# Patient Record
Sex: MALE | Race: WHITE | NOT HISPANIC OR LATINO | Employment: FULL TIME | URBAN - METROPOLITAN AREA
[De-identification: names, ages, dates, MRNs, and addresses within clinical notes are randomized per-mention and may not be internally consistent; named-entity substitution may affect disease eponyms.]

---

## 2017-08-29 ENCOUNTER — TELEPHONE (OUTPATIENT)
Dept: MEDICAL GROUP | Age: 27
End: 2017-08-29

## 2017-08-29 NOTE — TELEPHONE ENCOUNTER
NEW PATIENT VISIT PRE-VISIT PLANNING    1.  EpicCare Patient is checked in Patient Demographics? YES    2.  Immunizations were updated in Epic using WebIZ?: No WebIZ record       •  Web Iz Recommendations:     3.  Is this appointment scheduled as a Hospital Follow-Up? No    4.  Patient is due for the following Health Maintenance Topics:   Health Maintenance Due   Topic Date Due   • IMM DTaP/Tdap/Td Vaccine (1 - Tdap) 08/22/2005   • IMM INFLUENZA (1) 09/01/2017           5.  Reviewed/Updated the following with patient:       •   Preferred Pharmacy? YES       •   Preferred Lab? YES       •   Medications? YES. Was Abstract Encounter opened and chart updated? YES       •   Social History? YES. Was Abstract Encounter opened and chart updated? YES       •   Family History? YES. Was Abstract Encounter opened and chart updated? YES    6.  Updated Care Team?       •   DME Company (gait device, O2, CPAP, etc.) N\A       •   Other Specialists (eye doctor, derm, GYN, cardiology, endo, etc): N\A    7.  Patient was informed to arrive 15 min prior to their scheduled appointment and bring in their medication bottles.

## 2017-08-30 ENCOUNTER — OFFICE VISIT (OUTPATIENT)
Dept: MEDICAL GROUP | Age: 27
End: 2017-08-30
Payer: COMMERCIAL

## 2017-08-30 VITALS
SYSTOLIC BLOOD PRESSURE: 124 MMHG | WEIGHT: 238.6 LBS | OXYGEN SATURATION: 95 % | DIASTOLIC BLOOD PRESSURE: 70 MMHG | TEMPERATURE: 97.7 F | HEIGHT: 73 IN | BODY MASS INDEX: 31.62 KG/M2 | HEART RATE: 85 BPM

## 2017-08-30 DIAGNOSIS — E66.9 OBESITY (BMI 30.0-34.9): ICD-10-CM

## 2017-08-30 DIAGNOSIS — F43.10 PTSD (POST-TRAUMATIC STRESS DISORDER): ICD-10-CM

## 2017-08-30 DIAGNOSIS — F32.1 MODERATE SINGLE CURRENT EPISODE OF MAJOR DEPRESSIVE DISORDER (HCC): ICD-10-CM

## 2017-08-30 DIAGNOSIS — Z23 NEED FOR TDAP VACCINATION: ICD-10-CM

## 2017-08-30 PROCEDURE — 90471 IMMUNIZATION ADMIN: CPT | Performed by: INTERNAL MEDICINE

## 2017-08-30 PROCEDURE — 90715 TDAP VACCINE 7 YRS/> IM: CPT | Performed by: INTERNAL MEDICINE

## 2017-08-30 PROCEDURE — 99204 OFFICE O/P NEW MOD 45 MIN: CPT | Mod: 25 | Performed by: INTERNAL MEDICINE

## 2017-08-30 RX ORDER — BUPROPION HYDROCHLORIDE 100 MG/1
100 TABLET, EXTENDED RELEASE ORAL 2 TIMES DAILY
Qty: 60 TAB | Refills: 3 | Status: SHIPPED | OUTPATIENT
Start: 2017-08-30 | End: 2022-06-14

## 2017-08-30 ASSESSMENT — PATIENT HEALTH QUESTIONNAIRE - PHQ9
5. POOR APPETITE OR OVEREATING: 0 - NOT AT ALL
CLINICAL INTERPRETATION OF PHQ2 SCORE: 4
SUM OF ALL RESPONSES TO PHQ QUESTIONS 1-9: 15

## 2017-08-30 ASSESSMENT — PAIN SCALES - GENERAL: PAINLEVEL: NO PAIN

## 2017-08-31 NOTE — ASSESSMENT & PLAN NOTE
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 However he overcomes fear and he is able to work very well. He still feel anxious and nervous sometimes. He denied hallucination or delusion. He denied thoughts of harming other people or himself.

## 2017-08-31 NOTE — PROGRESS NOTES
Janes Troncoso is a 27 y.o. male here to establish care and the evaluation and management of:      HPI:    Moderate single current episode of major depressive disorder (CMS-HCC)  Patient presented to clinic with his father for his depression and PTSD. 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 Since then, he believes that he might have emotional trauma and PTSD. He already changed his job and he is working as a  now. He reported that he has depressed mood constantly with loss of interest and unable to focus on his job. He feels sad and grief without reason. He denied change in appetite. He state that he tried to control his mood with smoking marijuana or drinking alcohol. He stated that he used to smoke marijuana several times a day and he cut down to once a week. He drinks 4-6 can of beer every other day. He denied other substance use or denied IV drug use. He has never been treated with antidepressants in the past. He had thought of suicide in the past, but he denied suicidal ideation or plan or homicidal ideation or plan currently.    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 However he overcomes fear and he is able to work very well. He still feel anxious and nervous sometimes. He denied hallucination or delusion. He denied thoughts of harming other people or himself.    Current medicines (including changes today)  Current Outpatient Prescriptions   Medication Sig Dispense Refill   • buPROPion SR (WELLBUTRIN-SR) 100 MG TABLET SR 12 HR Take 1 Tab by mouth 2 times a day. 60 Tab 3     No current facility-administered medications for this visit.      He  has a past medical history of Anxiety and Depression.  He  has a past surgical history that includes tonsillectomy.  Social History   Substance Use Topics   • Smoking status:  "Former Smoker     Packs/day: 1.00     Years: 2.00     Types: Cigarettes     Quit date: 2015   • Smokeless tobacco: Former User     Types: Chew   • Alcohol use 19.2 oz/week     32 Cans of beer per week      Comment: 6 can beer every other day     Social History     Social History Narrative   • No narrative on file     Family History   Problem Relation Age of Onset   • Cancer Mother 58     breast cancer   • Arthritis Mother    • Cancer Father      laryngeal cancer   • Lung Disease Father      emphysema   • No Known Problems Brother    • Cancer Maternal Grandmother      breast cancer   • Cancer Maternal Grandfather      liver cancer   • No Known Problems Paternal Grandmother    • No Known Problems Paternal Grandfather      Family Status   Relation Status   • Mother Alive   • Father Alive   • Brother Alive   • Maternal Grandmother    • Maternal Grandfather    • Paternal Grandmother    • Paternal Grandfather      Health Maintenance Topics with due status: Overdue       Topic Date Due    IMM DTaP/Tdap/Td Vaccine 2007     Health Maintenance Topics with due status: Due Soon       Topic Date Due    IMM INFLUENZA 2017         ROS    Gen.: Denied weight change, appetite change, fatigue.  ENT: Denied sinus tenderness, nasal congestion, runny nose, or sore throat  CVS: Denied chest pain, palpitations, legs swelling.  Respiratory: Denied cough, shortness of breath, wheezing.  GI: Denied abdominal pain, constipation or diarrhea.  Endocrine: Denied temperature intolerance, increased frequency of urination, polyphagia or polydipsia.  Musculoskeletal: Denied back pain or joint pain.    All other systems reviewed and are negative     Objective:     Blood pressure 124/70, pulse 85, temperature 36.5 °C (97.7 °F), height 1.854 m (6' 1\"), weight 108.2 kg (238 lb 9.6 oz), SpO2 95 %. Body mass index is 31.48 kg/m².  Physical Exam:    Constitutional: Well nourished and Well developed, Alert, no " distress.  Skin: Warm, dry, good turgor, no rashes in visible areas.  Eye: Equal, round and reactive, conjunctiva clear, lids normal.  ENMT: Lips without lesions, good dentition, oropharynx clear.  Neck: Trachea midline, no masses, no thyromegaly. No cervical or supraclavicular lymphadenopathy.  Respiratory: Unlabored respiratory effort, lungs clear to auscultation, no wheezes, no ronchi.  Cardiovascular: Normal S1, S2, no murmur, no edema.   Abdomen: Soft, non distended, non-tender, no masses, no hepatosplenomegaly. Bowel sound normal.  Extremities: No edema, no clubbing, no cyanosis.  Psych: Alert and oriented x3, normal affect and mood.          Assessment and Plan:   The following treatment plan was discussed       1. Moderate single current episode of major depressive disorder (CMS-HCC)  - Prescribed Wellbutrin  mg twice a day. Advise patient and father to closely monitor his mood and signs or symptoms of side effects from taking Wellbutrin. He lives with his friends. He also keeps in touch with his father who lives in town.  - Discussed with patient regarding the use and side effects of medication as well as black box warning of suicidality risk with medication. Patient verbally understands. Recommend to call 911 or go to ER if patient has suicidal ideation or plan.   - Refer to psychiatrist and psychologist.  - buPROPion SR (WELLBUTRIN-SR) 100 MG TABLET SR 12 HR; Take 1 Tab by mouth 2 times a day.  Dispense: 60 Tab; Refill: 3  - CBC WITH DIFFERENTIAL; Future  - COMP METABOLIC PANEL; Future  - Patient has been identified as being depressed and appropriate orders and counseling have been given  - REFERRAL TO PSYCHIATRY  - REFERRAL TO PSYCHOLOGY    2. PTSD (post-traumatic stress disorder)  - Advised to do regular physical exercise to release stress and anxiety.  - Refer to psychiatrist for further management. Refer to psychologist for cognitive behavioral treatment.  - buPROPion SR (WELLBUTRIN-SR) 100 MG  TABLET SR 12 HR; Take 1 Tab by mouth 2 times a day.  Dispense: 60 Tab; Refill: 3  - CBC WITH DIFFERENTIAL; Future  - COMP METABOLIC PANEL; Future  - Patient has been identified as being depressed and appropriate orders and counseling have been given  - REFERRAL TO PSYCHIATRY  - REFERRAL TO PSYCHOLOGY    3. Obesity (BMI 30.0-34.9)  - Counseled for healthy diet and regular physical exercise to lose weight.   - CBC WITH DIFFERENTIAL; Future  - COMP METABOLIC PANEL; Future  - Patient identified as having weight management issue.  Appropriate orders and counseling given.    4. Need for Tdap vaccination  - Tdap vaccine was given today after reviewing risks and benefits as well as side effects of vaccine.  - TDAP VACCINE =>8YO IM        Records requested.  Followup: Return in about 8 weeks (around 10/25/2017), or if symptoms worsen or fail to improve, for depression, PTSD, lab review. sooner should new symptoms or problems arise.      Please note that this dictation was created using voice recognition software. I have made every reasonable attempt to correct obvious errors, but I expect that there may have unintended errors in text, spelling, punctuation, or grammar that I did not discover.

## 2017-08-31 NOTE — ASSESSMENT & PLAN NOTE
Patient presented to clinic with his father for his depression and PTSD. 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 Since then, he believes that he might have emotional trauma and PTSD. He already changed his job and he is working as a  now. He reported that he has depressed mood constantly with loss of interest and unable to focus on his job. He feels sad and grief without reason. He denied change in appetite. He state that he tried to control his mood with smoking marijuana or drinking alcohol. He stated that he used to smoke marijuana several times a day and he cut down to once a week. He drinks 4-6 can of beer every other day. He denied other substance use or denied IV drug use. He has never been treated with antidepressants in the past. He had thought of suicide in the past, but he denied suicidal ideation or plan or homicidal ideation or plan currently.

## 2017-09-29 ENCOUNTER — HOSPITAL ENCOUNTER (EMERGENCY)
Facility: MEDICAL CENTER | Age: 27
End: 2017-09-29
Attending: EMERGENCY MEDICINE
Payer: OTHER MISCELLANEOUS

## 2017-09-29 VITALS
WEIGHT: 240.3 LBS | SYSTOLIC BLOOD PRESSURE: 135 MMHG | BODY MASS INDEX: 31.85 KG/M2 | TEMPERATURE: 98 F | HEART RATE: 80 BPM | HEIGHT: 73 IN | DIASTOLIC BLOOD PRESSURE: 77 MMHG | OXYGEN SATURATION: 98 % | RESPIRATION RATE: 16 BRPM

## 2017-09-29 DIAGNOSIS — S05.02XA LEFT CORNEAL ABRASION, INITIAL ENCOUNTER: ICD-10-CM

## 2017-09-29 PROCEDURE — 99283 EMERGENCY DEPT VISIT LOW MDM: CPT

## 2017-09-29 RX ORDER — ERYTHROMYCIN 5 MG/G
1 OINTMENT OPHTHALMIC 3 TIMES DAILY
Qty: 1 TUBE | Refills: 0 | Status: SHIPPED | OUTPATIENT
Start: 2017-09-29 | End: 2017-10-04

## 2017-09-29 RX ORDER — ERYTHROMYCIN 5 MG/G
OINTMENT OPHTHALMIC
Status: DISCONTINUED
Start: 2017-09-29 | End: 2017-09-30 | Stop reason: HOSPADM

## 2017-09-29 ASSESSMENT — PAIN SCALES - GENERAL: PAINLEVEL_OUTOF10: 4

## 2017-09-29 NOTE — LETTER
"  FORM C-4:  EMPLOYEE’S CLAIM FOR COMPENSATION/ REPORT OF INITIAL TREATMENT  EMPLOYEE’S CLAIM - PROVIDE ALL INFORMATION REQUESTED   First Name  Janes Last Name  Aba Birthdate             Age  1990 27 y.o. 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 Be specific and answer in detail. Use additional sheet if necessary)  Grinding base Plate, Possible metal sliver flow in eye   If you believe that you have an occupational disease, when did you first have knowledge of the disability and it relationship to your employment?  n/a Witnesses to the Accident  none     Nature of Injury or Occupational Disease  Workers' Compensation  Part(s) of Body Injured or Affected  Eye (L), N/A, N/A    I certify that the above is true and correct to the best of my knowledge and that I have provided this information in order to obtain the benefits of [place] " [place]  I hereby authorize any physician, chiropractor, surgeon, practitioner, or other person, any hospital, including [facility] or [facility] hospital, any medical service organization, any insurance company, or other institution or organization to release to each other, any medical or other information, including benefits paid or payable, pertinent to this injury or disease, except information relative to diagnosis, treatment and/or counseling for AIDS, psychological conditions, alcohol or controlled substances, for which I must give specific authorization.  A Photostat of this authorization shall be as valid as the original.   Date  09/29/2017 Place  [facility] Employee’s Signature   THIS REPORT MUST BE COMPLETED AND MAILED WITHIN 3 WORKING DAYS OF TREATMENT   Place  [facility] [place], EMERGENCY DEPT  Name of Facility   [facility] [place]   Date  9/29/2017 Diagnosis  (S05.02XA) Left corneal abrasion, initial encounter Is there evidence the injured employee was under the influence of alcohol and/or another controlled substance at the time of accident?   Hour  10:40 PM Description of Injury or Disease  Left corneal abrasion, initial encounter No   Treatment  Erythromycin eye ointment.   Have you advised the patient to remain off work five days or more?         No   X-Ray Findings    Comments:no xray   If Yes   From Date    To Date      From information given by the employee, together with medical evidence, can you directly connect this injury or occupational disease as job incurred?  Yes If No, is the employee capable of: Full Duty  Yes Modified Duty      Is additional medical care by a physician indicated?  Yes  Comments:Follow up with occupational health If Modified Duty, Specify any Limitations / Restrictions        Do you know of any previous injury or disease  "contributing to this condition or occupational disease?  No   Date  9/29/2017 Print Doctor’s Name  Yordan Lennon Ii certify the employer’s copy of this form was mailed on:   Address  [address] [place] [address] [phone] Insurer’s Use Only   [facility]  [address]    Provider’s Tax ID Number  [id number] Telephone  Dept: [phone]    Doctor’s Signature  e-SignSYORDAN GARCIA II, M.D. Degree  M.D.    Original - TREATING PHYSICIAN OR CHIROPRACTOR   Pg 2-Insurer/TPA   Pg 3-Employer   Pg 4-Employee                                                                                                  Form C-4 (rev01/03)     BRIEF DESCRIPTION OF RIGHTS AND BENEFITS  (Pursuant to [place] 616C.050)    Notice of Injury or Occupational Disease (Incident Report Form C-1): If an injury or occupational disease (OD) arises out of and in the course of employment, you must provide written notice to your employer as soon as practicable, but no later than 7 days after the accident or OD. Your employer shall maintain a sufficient supply of the required forms.    Claim for Compensation (Form C-4): If medical treatment is sought, the form C-4 is available at the place of initial treatment. A completed \"Claim for Compensation\" (Form C-4) must be filed within 90 days after an accident or OD. The treating physician or chiropractor must, within 3 working days after treatment, complete and mail to the employer, the employer's insurer and third-party , the Claim for Compensation.    Medical Treatment: If you require medical treatment for your on-the-job injury or OD, you may be required to select a physician or chiropractor from a list provided by your workers’ compensation insurer, if it has contracted with an Organization for Managed Care (MCO) or Preferred Provider Organization (PPO) or providers of health care. If your employer has not entered into a contract with an MCO or PPO, you may " select a physician or chiropractor from the Panel of Physicians and Chiropractors. Any medical costs related to your industrial injury or OD will be paid by your insurer.    Temporary Total Disability (TTD): If your doctor has certified that you are unable to work for a period of at least 5 consecutive days, or 5 cumulative days in a 20-day period, or places restrictions on you that your employer does not accommodate, you may be entitled to TTD compensation.    Temporary Partial Disability (TPD): If the wage you receive upon reemployment is less than the compensation for TTD to which you are entitled, the insurer may be required to pay you TPD compensation to make up the difference. TPD can only be paid for a maximum of 24 months.    Permanent Partial Disability (PPD): When your medical condition is stable and there is an indication of a PPD as a result of your injury or OD, within 30 days, your insurer must arrange for an evaluation by a rating physician or chiropractor to determine the degree of your PPD. The amount of your PPD award depends on the date of injury, the results of the PPD evaluation and your age and wage.    Permanent Total Disability (PTD): If you are medically certified by a treating physician or chiropractor as permanently and totally disabled and have been granted a PTD status by your insurer, you are entitled to receive monthly benefits not to exceed 66 2/3% of your average monthly wage. The amount of your PTD payments is subject to reduction if you previously received a PPD award.    Vocational Rehabilitation Services: You may be eligible for vocational rehabilitation services if you are unable to return to the job due to a permanent physical impairment or permanent restrictions as a result of your injury or occupational disease.    Transportation and Per Eileen Reimbursement: You may be eligible for travel expenses and per eileen associated with medical treatment.  Reopening: You may be able to  reopen your claim if your condition worsens after claim closure.    Appeal Process: If you disagree with a written determination issued by the insurer or the insurer does not respond to your request, you may appeal to the Department of Administration, , by following the instructions contained in your determination letter. You must appeal the determination within 70 days from the date of the determination letter at [address], [address], [place], [place] [address], or [address], [address], [place], [place] [address]. If you disagree with the  decision, you may appeal to the Department of Administration, . You must file your appeal within 30 days from the date of the  decision letter at [address], [address], [place], [place] [address], or [address], [facility] [address], [place], [place] [address]. If you disagree with a decision of an , you may file a petition for judicial review with the District Court. You must do so within 30 days of the Appeal Officer’s decision. You may be represented by an  at your own expense or you may contact the [facility] for possible representation.    [place]  for Injured Workers (NAIW): If you disagree with a  decision, you may request that NAIW represent you without charge at an  Hearing. For information regarding denial of benefits, you may contact the [facility] at: [address], [address], [place] [address], [phone], or [address], [place] [address], [phone]    To File a Complaint with the Division: If you wish to file a complaint with the  of the Division of Industrial Relations (DIR), please contact the Workers’ Compensation Section, [address], [address], [place], [place] [address], telephone [phone], or [address], [place]  [address], telephone [phone].    For assistance with Workers’ Compensation Issues: you may contact the Office of the Governor Consumer Health Assistance, [address] [place] [address], [address], [place], [place] [address], Toll Free 1-972.167.3305, Web site: http://[employer].[place]., E-mail [email or web address].                                                                                                                                                                               __________________________________________________________________                                    09/29/2017            Employee Name / Signature                                                                                                                            Date                                       D-2 (rev. 10/07)

## 2017-09-30 ASSESSMENT — ENCOUNTER SYMPTOMS
BLURRED VISION: 0
HEADACHES: 0

## 2017-09-30 NOTE — ED PROVIDER NOTES
"ED Provider Note    ED Provider Note    Scribed for IRMA Almeida II* by Yordan Lennon II. 9/29/2017  10:39 PM    Means of Arrival: POV  History obtained by: patient  Limitations: none    CHIEF COMPLAINT  Chief Complaint   Patient presents with   • Eye Pain       HPI  Janes Troncoso is a 27 y.o. 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 He reports using protective eye wear. He says he immediately felt like something was in his eye. Has had a sensation that something is inside the past several hours. Reports normal vision. Reports frequent eye watering. No headache. No light sensitivity.    REVIEW OF SYSTEMS  Review of Systems   Eyes: Negative for blurred vision.        See HPI   Skin:        No skin trauma   Neurological: Negative for headaches.   All other systems reviewed and are negative.    See HPI for further details.     PAST MEDICAL HISTORY   has a past medical history of Foot pain and Lower back pain.    SOCIAL HISTORY  Social History     Social History Main Topics   • Smoking status: Former Smoker     Years: 4.00     Types: Pipe     Quit date: 8/29/2015   • Smokeless tobacco: Never Used   • Alcohol use Yes      Comment: occasional   • Drug use: No   • Sexual activity: Yes     Partners: Female       SURGICAL HISTORY  patient denies any surgical history    CURRENT MEDICATIONS  Home Medications     Reviewed by Jovanny Bustamante R.N. (Registered Nurse) on 09/29/17 at 2005  Med List Status: Complete   Medication Last Dose Status        Patient Mohit Taking any Medications                       ALLERGIES  Allergies   Allergen Reactions   • Codeine Anaphylaxis   • Vicodin [Hydrocodone-Acetaminophen]      headaches       PHYSICAL EXAM  VITAL SIGNS: /77   Pulse 80   Temp 36.7 °C (98 °F)   Resp 16   Ht 1.854 m (6' 1\") Comment: Stated  Wt 109 kg (240 lb 4.8 oz)   SpO2 98%   BMI 31.70 kg/m²    Pulse ox interpretation: I interpret this pulse ox as normal.  Constitutional: Alert in " no apparent distress.  HENT: Normocephalic, Atraumatic, Bilateral external ears normal. Nose normal.   Eyes: Pupils are equal and reactive. Conjunctiva injected, non-icteric. Eyelids were flipped and swept the room now foreign objects found. Slit-lamp used to look for signs of foreign body. I did not visualize any foreign bodies within the eye. No cell or flare in the anterior chamber. There was faint amount of force scene uptake across the cornea which appeared to have a pattern as though there was a foreign body under the eyelid for some time.  Heart: Regular rate and rythm, no murmurs.    Lungs: Clear to auscultation bilaterally.  Skin: Warm, Dry, No erythema, No rash.   Neurologic: Alert, Grossly non-focal.   Psychiatric: Affect normal, Judgment normal, Mood normal, Appears appropriate and not intoxicated.           COURSE & MEDICAL DECISION MAKING  Pertinent Labs & Imaging studies reviewed. (See chart for details)    10:39 PM This is an emergent evaluation of a 27 y.o., male who presents with left eye discomfort and the differential diagnosis includes but is not limited to foreign body, corneal abrasion, globe perforation. Pain relieved with tetracaine area Eye exam revealed light abrasions to left cornea. No signs of globe disruption. No foreign body visualized. No foreign body found beneath the eyelids. Patient will be treated with erythromycin ointment corneal abrasion. He is provided with follow-up information with ophthalmologist.    The patient will not drink alcohol nor drive with prescribed medications. The patient will return for worsening symptoms and is stable at the time of discharge. The patient verbalizes understanding and will comply.    FINAL IMPRESSION  1. Corneal abrasion     I, Yordan Lennon II ([place]), am scribing for, and in the presence of, IRMA Almeida II*.    Electronically signed by: Yordan Lennon II ([place]), 9/29/2017

## 2017-09-30 NOTE — ED NOTES
Discharge instructions provided.  Pt verbalized the understanding of discharge instructions to follow up with Occupational health, PCP and to return to ER if condition worsens.  Pt ambulated out of ER without difficulty.

## 2017-09-30 NOTE — DISCHARGE INSTRUCTIONS
Corneal Abrasion  The cornea is the clear covering at the front and center of the eye. When looking at the colored portion of the eye (iris), you are looking through the cornea. This very thin tissue is made up of many layers. The surface layer is a single layer of cells (corneal epithelium) and is one of the most sensitive tissues in the body. If a scratch or injury causes the corneal epithelium to come off, it is called a corneal abrasion. If the injury extends to the tissues below the epithelium, the condition is called a corneal ulcer.  CAUSES   · Scratches.  · Trauma.  · Foreign body in the eye.  Some people have recurrences of abrasions in the area of the original injury even after it has healed (recurrent erosion syndrome). Recurrent erosion syndrome generally improves and goes away with time.  SYMPTOMS   · Eye pain.  · Difficulty or inability to keep the injured eye open.  · The eye becomes very sensitive to light.  · Recurrent erosions tend to happen suddenly, first thing in the morning, usually after waking up and opening the eye.  DIAGNOSIS   Your health care provider can diagnose a corneal abrasion during an eye exam. Dye is usually placed in the eye using a drop or a small paper strip moistened by your tears. When the eye is examined with a special light, the abrasion shows up clearly because of the dye.  TREATMENT   · Small abrasions may be treated with antibiotic drops or ointment alone.  · A pressure patch may be put over the eye. If this is done, follow your doctor's instructions for when to remove the patch. Do not drive or use machines while the eye patch is on. Judging distances is hard to do with a patch on.  If the abrasion becomes infected and spreads to the deeper tissues of the cornea, a corneal ulcer can result. This is serious because it can cause corneal scarring. Corneal scars interfere with light passing through the cornea and cause a loss of vision in the involved eye.  HOME CARE  INSTRUCTIONS  · Use medicine or ointment as directed. Only take over-the-counter or prescription medicines for pain, discomfort, or fever as directed by your health care provider.  · Do not drive or operate machinery if your eye is patched. Your ability to  distances is impaired.  · If your health care provider has given you a follow-up appointment, it is very important to keep that appointment. Not keeping the appointment could result in a severe eye infection or permanent loss of vision. If there is any problem keeping the appointment, let your health care provider know.  SEEK MEDICAL CARE IF:   · You have pain, light sensitivity, and a scratchy feeling in one eye or both eyes.  · Your pressure patch keeps loosening up, and you can blink your eye under the patch after treatment.  · Any kind of discharge develops from the eye after treatment or if the lids stick together in the morning.  · You have the same symptoms in the morning as you did with the original abrasion days, weeks, or months after the abrasion healed.  MAKE SURE YOU:   · Understand these instructions.  · Will watch your condition.  · Will get help right away if you are not doing well or get worse.     This information is not intended to replace advice given to you by your health care provider. Make sure you discuss any questions you have with your health care provider.     Document Released: 12/15/2001 Document Revised: 12/23/2014 Document Reviewed: 08/25/2014  [facility] Interactive Patient Education ©2016 [employer]

## 2017-10-20 ENCOUNTER — TELEPHONE (OUTPATIENT)
Dept: MEDICAL GROUP | Age: 27
End: 2017-10-20

## 2017-10-20 NOTE — TELEPHONE ENCOUNTER
ESTABLISHED PATIENT PRE-VISIT PLANNING     Note: Patient will not be contacted if there is no indication to call.     1.  Reviewed notes from the last few office visits within the medical group: Yes    2.  If any orders were placed at last visit or intended to be done for this visit (i.e. 6 mos follow-up), do we have Results/Consult Notes?        •  Labs - Labs ordered, NOT completed. Patient advised to complete prior to next appointment.   Note: If patient appointment is for lab review and patient did not complete labs,                check with provider if OK to reschedule patient until labs completed.       •  Imaging - Imaging was not ordered at last office visit.       •  Referrals - No referrals were ordered at last office visit.    3. Is this appointment scheduled as a Hospital Follow-Up? No    4.  Immunizations were updated in Epic using WebIZ?: Epic matches WebIZ       •  Web Iz Recommendations: FLU, HEPATITIS A  and VARICELLA (Chicken Pox)     5.  Patient is due for the following Health Maintenance Topics:   Health Maintenance Due   Topic Date Due   • IMM INFLUENZA (1) 09/01/2017       - Patient is up-to-date on all Health Maintenance topics. No records have been requested at this time.    6.  Patient was informed to arrive 15 min prior to their scheduled appointment and bring in their medication bottles.

## 2017-10-23 ENCOUNTER — APPOINTMENT (OUTPATIENT)
Dept: MEDICAL GROUP | Age: 27
End: 2017-10-23
Payer: COMMERCIAL

## 2018-06-27 ENCOUNTER — APPOINTMENT (OUTPATIENT)
Dept: RADIOLOGY | Facility: IMAGING CENTER | Age: 28
End: 2018-06-27
Attending: PHYSICIAN ASSISTANT
Payer: COMMERCIAL

## 2018-06-27 ENCOUNTER — OFFICE VISIT (OUTPATIENT)
Dept: URGENT CARE | Facility: CLINIC | Age: 28
End: 2018-06-27
Payer: COMMERCIAL

## 2018-06-27 VITALS
OXYGEN SATURATION: 97 % | HEIGHT: 73 IN | RESPIRATION RATE: 16 BRPM | SYSTOLIC BLOOD PRESSURE: 122 MMHG | TEMPERATURE: 98.8 F | WEIGHT: 222 LBS | DIASTOLIC BLOOD PRESSURE: 64 MMHG | BODY MASS INDEX: 29.42 KG/M2 | HEART RATE: 66 BPM

## 2018-06-27 DIAGNOSIS — M77.8 LEFT WRIST TENDONITIS: ICD-10-CM

## 2018-06-27 DIAGNOSIS — M25.532 LEFT WRIST PAIN: ICD-10-CM

## 2018-06-27 PROCEDURE — 99204 OFFICE O/P NEW MOD 45 MIN: CPT | Performed by: PHYSICIAN ASSISTANT

## 2018-06-27 PROCEDURE — 73110 X-RAY EXAM OF WRIST: CPT | Mod: TC,FY,LT | Performed by: PHYSICIAN ASSISTANT

## 2018-06-27 RX ORDER — METHYLPREDNISOLONE 4 MG/1
TABLET ORAL
Qty: 1 KIT | Refills: 0 | Status: SHIPPED | OUTPATIENT
Start: 2018-06-27 | End: 2022-06-14

## 2018-06-27 ASSESSMENT — ENCOUNTER SYMPTOMS
COUGH: 0
SENSORY CHANGE: 0
SPEECH CHANGE: 0
DOUBLE VISION: 0
DIZZINESS: 0
FOCAL WEAKNESS: 0
LOSS OF CONSCIOUSNESS: 0
TINGLING: 0
FEVER: 0
TREMORS: 0
NUMBNESS: 0
BLURRED VISION: 0
SEIZURES: 0
SHORTNESS OF BREATH: 0
HEADACHES: 0
PALPITATIONS: 0
CHILLS: 0

## 2018-06-27 NOTE — LETTER
June 27, 2018         Patient: Janes Troncoso   YOB: 1990   Date of Visit: 6/27/2018           To Whom it May Concern:    Janes Troncoso was seen in my clinic on 6/27/2018. Please excuse him from work 6/28-6/29/2018  If you have any questions or concerns, please don't hesitate to call.        Sincerely,           Phong Shine P.A.-C.  Electronically Signed

## 2018-06-27 NOTE — PROGRESS NOTES
Subjective:      Janes Troncoso is a 27 y.o. male who presents with Wrist Pain (yesterday wrsit painful to move/bend )            Wrist Pain    The incident occurred 12 to 24 hours ago. There was no injury mechanism. Pain location: left wrist. The quality of the pain is described as aching. The pain does not radiate. The pain is moderate. Pertinent negatives include no chest pain, numbness or tingling. The symptoms are aggravated by movement. He has tried nothing for the symptoms.       Review of Systems   Constitutional: Negative for chills and fever.   Eyes: Negative for blurred vision and double vision.   Respiratory: Negative for cough and shortness of breath.    Cardiovascular: Negative for chest pain and palpitations.   Musculoskeletal:        Left wrist pain.   Skin: Negative for rash.   Neurological: Negative for dizziness, tingling, tremors, sensory change, speech change, focal weakness, seizures, loss of consciousness, numbness and headaches.   All other systems reviewed and are negative.    PMH:  has a past medical history of Anxiety; Depression; Foot pain; and Lower back pain. He also has no past medical history of ASTHMA; Diabetes; or Seizure ([facility]).  MEDS:   Current Outpatient Prescriptions:   •  MethylPREDNISolone (MEDROL DOSEPAK) 4 MG Tablet Therapy Pack, Take as directed on package., Disp: 1 Kit, Rfl: 0  •  buPROPion SR (WELLBUTRIN-SR) 100 MG TABLET SR 12 HR, Take 1 Tab by mouth 2 times a day., Disp: 60 Tab, Rfl: 3  ALLERGIES:   Allergies   Allergen Reactions   • Codeine Anaphylaxis   • Codeine Unspecified     Just felt off     • Vicodin [Hydrocodone-Acetaminophen]      headaches     SURGHX:   Past Surgical History:   Procedure Laterality Date   • TONSILLECTOMY       SOCHX:  reports that he quit smoking about 2 years ago. His smoking use included Pipe and Cigarettes. He has a 4.00 pack-year smoking history. He has never used smokeless tobacco. He reports that he drinks alcohol. He reports that he  "does not use drugs.  FH: Family history was reviewed, no pertinent findings to report  Medications, Allergies, and current problem list reviewed today in Epic       Objective:     /64   Pulse 66   Temp 37.1 °C (98.8 °F)   Resp 16   Ht 1.854 m (6' 1\")   Wt 100.7 kg (222 lb)   SpO2 97%   BMI 29.29 kg/m²      Physical Exam   Constitutional: He is oriented to person, place, and time. He appears well-developed and well-nourished.  Non-toxic appearance. He does not have a sickly appearance. He does not appear ill. No distress.   HENT:   Head: Normocephalic and atraumatic.   Right Ear: External ear normal.   Left Ear: External ear normal.   Eyes: Conjunctivae and EOM are normal.   Neck: Normal range of motion. Neck supple.   Cardiovascular: Normal rate, regular rhythm, normal heart sounds, intact distal pulses and normal pulses.    Pulmonary/Chest: Effort normal and breath sounds normal.   Musculoskeletal: Normal range of motion. He exhibits tenderness. He exhibits no edema or deformity.   PTP of the medial aspect of wrist with crepitus.  No swelling or erythema.   Neurological: He is alert and oriented to person, place, and time. He has normal reflexes. He displays normal reflexes. He exhibits normal muscle tone. Coordination normal.   Skin: Skin is warm and dry. He is not diaphoretic.   Psychiatric: He has a normal mood and affect. His behavior is normal. Judgment and thought content normal.   Vitals reviewed.         6/27/2018 10:51 AM    HISTORY/REASON FOR EXAM:  Atraumatic Pain/Swelling/Deformity  Left medial wrist pain x 4 days, no known injury    TECHNIQUE/EXAM DESCRIPTION AND NUMBER OF VIEWS:  4 views of the LEFT wrist.    COMPARISON: None    FINDINGS:    No acute fracture or dislocation. The carpal rows appear intact.    No joint osteoarthritis.   Impression         No acute osseous abnormality.          Assessment/Plan:   Patient is a 27-year-old male who complains of left wrist pain for 2 days. He " states that he woke up with pain and denies any trauma to the area. He is a  and lifts heavy objects throughout the day. On exam there is no swelling or erythema. There is pain to palpation on the medial aspect of the wrist is otherwise normal range of motion. X-rays normal. Most likely tendinitis.  1. Left wrist tendonitis  - Steroids  - Splint/RICE  - Exercise handout given  - DX-WRIST-COMPLETE 3+ LEFT; Future    Differential diagnosis, natural history, supportive care discussed. Follow-up with primary care provider within 7-10 days, emergency room precautions discussed.  Patient and/or family appears understanding of information.  Handout and review of patients diagnosis and treatment was discussed extensively.

## 2018-06-27 NOTE — PATIENT INSTRUCTIONS
Flexor Carpi Ulnaris and Flexor Carpi Radialis Tendinitis  What are the causes?  These conditions may be caused by:  · Repetitive motions or overuse (common).  · Wear and tear. (common).  · An injury.  · Excessive exercise or strain.  · Certain antibiotic medicines.  In some cases, the cause may not be known.  What increases the risk?  These conditions are more likely to develop in:  · People who play sports that involve constantly flexing or stretching the wrist and forearm, such as volleyball and water polo.  · Older adults.  · People with have a job that involves flexing the wrist over and over, such as people who work as typists, butchers, and cashiers.  · People with certain health conditions, such as:  ¨ Rheumatoid arthritis.  ¨ Gout.  ¨ Diabetes.  What are the signs or symptoms?  Symptoms of these conditions may develop gradually. Symptoms include:  · Pain or tenderness in the wrist.  · Pain when flexing or stretching the wrist.  · Pain when gripping or lifting with the palm of the hand.  · Swelling.  How is this diagnosed?  This condition may be diagnosed based on:  · Your symptoms.  · Your medical history.  · A physical exam.  During the physical exam, you may be asked to move your hand, wrist, and arm in certain ways. In order to rule out another condition, your health care provider may order one or more of the following tests:  · MRI to get detailed images of the body’s soft tissues and detect tendon tears and inflammation.  · Ultrasound to detect soft-tissue injuries, such as tears and inflammation of the ligaments or tendons.  How is this treated?  Treatment for this condition may include:  · Rest. You should limit activities that cause your symptoms to get worse or flare up.  · Heat and ice treatment. Both heat and cold can help to ease pain and may be applied to the wrist or forearm as needed to reduce pain and inflammation.  · Splint. You may need to wear a splint to keep your wrist and forearm from  moving (keep them immobilized) until your symptoms improve.  · Medicine. Your health care provider may prescribe steroids or other anti-inflammatory medicines, like ibuprofen, to temporarily ease your pain and other symptoms.  · Physical therapy. Your health care provider may ask you to do exercises to maintain mobility and range of motion in your wrist.  Follow these instructions at home:  If you have a splint:  · Wear it as told by your health care provider. Remove it only as told by your health care provider.  · Loosen the splint if your fingers tingle, become numb, or turn cold and blue.  · Do not let your splint get wet if it is not waterproof.  · Keep the splint clean.  Managing pain, stiffness, and swelling  · If directed, apply ice to the injured area.  ¨ Put ice in a plastic bag.  ¨ Place a damp towel between your skin and the bag.  ¨ Leave the ice on for 20 minutes, 2-3 times a day.  · Move your fingers often to avoid stiffness and to lessen swelling.  · Raise (elevate) the injured area above the level of your heart while you are sitting or lying down.  Activity  · Return to your normal activities as told by your health care provider. Ask your health care provider what activities are safe for you.  · Do exercises as told by your health care provider.  General instructions  · Do not use any tobacco products, including cigarettes, chewing tobacco, or e-cigarettes. Tobacco can delay healing. If you need help quitting, ask your health care provider.  · Take over-the-counter and prescription medicines only as told by your health care provider.  · Keep all follow-up visits as told by your health care provider. This is important.  How is this prevented?  · Warm up and stretch before being active.  · Cool down and stretch after being active.  · Give your body time to rest between periods of activity.  · Make sure to use equipment that fits you.  · Be safe and responsible while being active to avoid falls.  · Do at  least 150 minutes of moderate-intensity exercise each week, such as brisk walking or water aerobics.  · Maintain physical fitness, including:  ¨ Strength.  ¨ Flexibility.  ¨ Cardiovascular fitness.  ¨ Endurance.  Contact a health care provider if:  · Your pain does not improve.  · Your pain gets worse.  Get help right away if:  · Your pain is severe.  · You cannot move your wrist.  This information is not intended to replace advice given to you by your health care provider. Make sure you discuss any questions you have with your health care provider.  Document Released: 12/18/2006 Document Revised: 08/22/2017 Document Reviewed: 08/26/2016  [employer] Interactive Patient Education © 2017 [employer]    Flexor Carpi Ulnaris and Flexor Carpi Radialis Tendinitis Rehab  Ask your health care provider which exercises are safe for you. Do exercises exactly as told by your health care provider and adjust them as directed. It is normal to feel mild stretching, pulling, tightness, or discomfort as you do these exercises, but you should stop right away if you feel sudden pain or your pain gets worse. Do not begin these exercises until told by your health care provider.  Stretching  These exercises warm up your muscles and joints and improve the movement and flexibility of your forearm. These exercises also help to relieve pain, numbness, and tingling. These exercises are done using your healthy forearm to help stretch the muscles in your injured forearm.  Exercise A: Wrist flexion, passive  1. Extend your __________ arm in front of you, relax your wrist, and point your fingers downward.  2. Gently push on the back of your hand until you feel a gentle stretch on the top of your forearm.  3. Hold this position for __________ seconds.  4. Slowly return to the starting position.  Repeat __________ times. Complete this exercise __________ times a day.  Exercise B: Wrist extension, passive  1. Extend your __________ arm in front of  "you and turn your palm upward.  2. Gently pull your palm and fingertips back so your fingers point downward. You should feel a gentle stretch on the palm-side of your forearm.  3. Hold this position for __________ seconds.  4. Slowly return to the starting position.  Exercise C: Forearm rotation, palm up, passive  1. Sit with your __________ elbow bent to an \"L\" shape (90 degrees) with your forearm resting on a table.  2. Keeping your upper body and shoulder still, use your other hand to rotate your forearm palm up until you feel a gentle to moderate stretch.  3. Hold this position for __________ seconds.  4. Slowly release the stretch, and return to the starting position.  Repeat __________ times. Complete this exercise __________ times a day.  Exercise D: Forearm rotation, palm down, passive  1. Sit with your __________ elbow bent to an \"L\" shape (90 degrees) with your forearm resting on a table.  2. Keeping your upper body and shoulder still, use your other hand to rotate your forearm palm down until you feel a gentle to moderate stretch.  3. Hold this position for __________ seconds.  4. Slowly release the stretch, and return to the starting position.  Repeat __________ times. Complete this exercise __________ times a day.  Range of motion exercises  These exercises warm up your muscles and joints and improve the movement and flexibility of your forearm. These exercises also help to relieve pain, numbness, and tingling. These exercises are done using the muscles in your injured forearm.  Exercise E: Wrist flexion, active  1. With your fingers relaxed, bend your wrist forward as far as you can.  2. Hold this position for __________ seconds.  3. Slowly return to the starting position.  Repeat __________ times. Complete this exercise __________ times a day.  Exercise F: Wrist extension, active  1. With your fingers relaxed, bend your wrist backward as far as you can.  2. Hold this position for __________ " "seconds.  3. Slowly return to the starting position.  Repeat __________ times. Complete this exercise __________ times a day.  Exercise G: Supination, active  1. Stand or sit with your arms at your sides.  2. Bend your __________ elbow to an \"L\" shape (90 degrees).  3. Turn your palm upward until you feel a gentle stretch on the inside of your forearm.  4. Hold this position for __________ seconds.  5. Slowly return your palm to the starting position.  Repeat __________ times. Complete this exercise __________ times a day.  Exercise H: Pronation, active  1. Stand or sit with your arms at your sides.  2. Bend your __________ elbow to an \"L\" shape (90 degrees).  3. Turn your palm downward until you feel a gentle stretch on the top of your forearm.  4. Hold this position for __________ seconds.  5. Slowly return your palm to the starting position.  Repeat __________ times. Complete this exercise __________ times a day.  Strengthening exercises  These exercises build strength and endurance in your wrist and forearm. Endurance is the ability to use your muscles for a long time, even after they get tired.  Exercise I: Wrist flexors  1. Sit with your __________ forearm supported on a table and your hand resting palm-up over the edge of the table. Your elbow should be below the level of your shoulder.  2. Hold a __________ weight in your __________ hand. Or, hold a rubber exercise band or tube in both hands, keeping your hands at the same level and hip distance apart. There should be a slight tension in the exercise band or tube.  3. Slowly curl your hand up toward your forearm.  4. Hold this position for __________ seconds.  5. Slowly lower your hand back to the starting position.  Repeat __________ times. Complete this exercise __________ times a day.  Exercise J: Wrist extensors  1. Sit with your __________ forearm supported on a table and your hand resting palm-down over the edge of the table. Your elbow should be below " "the level of your shoulder.  2. Hold a __________ weight in your __________ hand. Or, hold a rubber exercise band or tube in both hands, keeping your hands at the same level and hip distance apart. There should be a slight tension in the exercise band or tube.  3. Slowly curl your hand up toward your forearm.  4. Hold this position for __________ seconds.  5. Slowly lower your hand back to the starting position.  Repeat __________ times. Complete this exercise __________ times a day.  Exercise K: Ulnar deviators  1. Stand with a __________ weight in your __________ hand. Or, sit with your healthy hand supported, and hold onto a rubber exercises band or tube. There should be a slight tension in the exercise band or tube.  2. Move your __________ wrist so your pinkie travels toward your forearm and your thumb moves away from your forearm.  3. Hold this position for __________ seconds.  4. Slowly lower your wrist to the starting position.  Repeat __________ times. Complete this exercise __________ times a day.  Exercise L: Radial deviators  1. Stand with a __________ weight in your __________ hand. Or, sit and hold onto a rubber exercise band or tube while your __________ arm is supported on a table and the other arm is below the table. There should be a slight tension in the exercise band or tube.  ¨ If you are holding a weight, raise your __________ hand so your thumb moves toward your forearm at a comfortable height. You will not need to raise your hand very far.  ¨ If you are holding an exercise band or tube, pull on it.  2. Hold this position for __________ seconds.  3. Slowly lower your wrist to the starting position.  Repeat __________ times. Complete this exercise __________ times a day.  Exercise M: Forearm rotation, palm up  1. Sit with your __________ forearm supported on a table and your hand resting palm-down. Your elbow should be at your side, below the level of your shoulder, and bent to an \"L\" shape " "(about 90 degrees). Keep your wrist stable. Do not allow it to move backward or forward during the exercise.  2. Gently hold a lightweight hammer with your __________ hand.  3. Without moving your elbow or wrist, slowly rotate your palm upward to a thumbs-up position.  4. Hold this position for __________ seconds.  5. Slowly return your forearm to the starting position.  Repeat __________ times. Complete this exercise __________ times a day.  Exercise N: Forearm rotation, palm down  1. Sit with your __________ forearm supported on a table and your hand resting palm-up. Your elbow should be at your side, below the level of your shoulder, and bent to an \"L\" shape (about 90 degrees). Keep your wrist stable. Do not allow it to move backward or forward during the exercise.  2. Gently hold a lightweight hammer with your __________ hand.  3. Without moving your elbow or wrist, slowly rotate your palm and hand upward to a thumbs-up position.  4. Hold this position for __________ seconds.  5. Slowly return your forearm to the starting position.  Repeat __________ times. Complete this exercise __________ times a day.  Exercise O:   1. Hold one of these items in your __________ hand: modelling elva, therapy putty, a dense sponge, a stress ball, or a large, rolled sock.  2. Squeeze as hard as you can without increasing any pain.  3. Hold this position for __________ seconds.  4. Slowly release your .  Repeat __________ times. Complete this exercise __________ times a day.  This information is not intended to replace advice given to you by your health care provider. Make sure you discuss any questions you have with your health care provider.  Document Released: 08/22/2017 Document Revised: 08/24/2017 Document Reviewed: 08/22/2017  [employer] Interactive Patient Education © 2017 [employer]    "

## 2022-06-13 ENCOUNTER — TELEPHONE (OUTPATIENT)
Dept: SCHEDULING | Facility: IMAGING CENTER | Age: 32
End: 2022-06-13

## 2022-06-14 ENCOUNTER — HOSPITAL ENCOUNTER (OUTPATIENT)
Dept: LAB | Facility: MEDICAL CENTER | Age: 32
End: 2022-06-14
Attending: FAMILY MEDICINE
Payer: COMMERCIAL

## 2022-06-14 ENCOUNTER — OFFICE VISIT (OUTPATIENT)
Dept: MEDICAL GROUP | Facility: PHYSICIAN GROUP | Age: 32
End: 2022-06-14
Payer: COMMERCIAL

## 2022-06-14 VITALS
HEART RATE: 60 BPM | SYSTOLIC BLOOD PRESSURE: 122 MMHG | BODY MASS INDEX: 29.55 KG/M2 | TEMPERATURE: 97.8 F | RESPIRATION RATE: 16 BRPM | WEIGHT: 223 LBS | HEIGHT: 73 IN | OXYGEN SATURATION: 99 % | DIASTOLIC BLOOD PRESSURE: 60 MMHG

## 2022-06-14 DIAGNOSIS — F32.1 MODERATE SINGLE CURRENT EPISODE OF MAJOR DEPRESSIVE DISORDER (HCC): ICD-10-CM

## 2022-06-14 DIAGNOSIS — Z13.6 SCREENING FOR CARDIOVASCULAR CONDITION: ICD-10-CM

## 2022-06-14 DIAGNOSIS — Z00.00 HEALTH CARE MAINTENANCE: ICD-10-CM

## 2022-06-14 DIAGNOSIS — F43.10 POSTTRAUMATIC STRESS DISORDER: ICD-10-CM

## 2022-06-14 DIAGNOSIS — Z76.89 ENCOUNTER TO ESTABLISH CARE: ICD-10-CM

## 2022-06-14 LAB
25(OH)D3 SERPL-MCNC: 27 NG/ML (ref 30–100)
ALBUMIN SERPL BCP-MCNC: 4.9 G/DL (ref 3.2–4.9)
ALBUMIN/GLOB SERPL: 2 G/DL
ALP SERPL-CCNC: 103 U/L (ref 30–99)
ALT SERPL-CCNC: 13 U/L (ref 2–50)
ANION GAP SERPL CALC-SCNC: 12 MMOL/L (ref 7–16)
AST SERPL-CCNC: 19 U/L (ref 12–45)
BILIRUB SERPL-MCNC: 0.6 MG/DL (ref 0.1–1.5)
BUN SERPL-MCNC: 17 MG/DL (ref 8–22)
CALCIUM SERPL-MCNC: 9.7 MG/DL (ref 8.5–10.5)
CHLORIDE SERPL-SCNC: 104 MMOL/L (ref 96–112)
CHOLEST SERPL-MCNC: 192 MG/DL (ref 100–199)
CO2 SERPL-SCNC: 24 MMOL/L (ref 20–33)
CREAT SERPL-MCNC: 1.17 MG/DL (ref 0.5–1.4)
ERYTHROCYTE [DISTWIDTH] IN BLOOD BY AUTOMATED COUNT: 40.8 FL (ref 35.9–50)
FASTING STATUS PATIENT QL REPORTED: NORMAL
GFR SERPLBLD CREATININE-BSD FMLA CKD-EPI: 84 ML/MIN/1.73 M 2
GLOBULIN SER CALC-MCNC: 2.5 G/DL (ref 1.9–3.5)
GLUCOSE SERPL-MCNC: 99 MG/DL (ref 65–99)
HCT VFR BLD AUTO: 43.8 % (ref 42–52)
HDLC SERPL-MCNC: 59 MG/DL
HGB BLD-MCNC: 15.1 G/DL (ref 14–18)
LDLC SERPL CALC-MCNC: 118 MG/DL
MCH RBC QN AUTO: 31.3 PG (ref 27–33)
MCHC RBC AUTO-ENTMCNC: 34.5 G/DL (ref 33.7–35.3)
MCV RBC AUTO: 90.7 FL (ref 81.4–97.8)
PLATELET # BLD AUTO: 249 K/UL (ref 164–446)
PMV BLD AUTO: 9.6 FL (ref 9–12.9)
POTASSIUM SERPL-SCNC: 4.3 MMOL/L (ref 3.6–5.5)
PROT SERPL-MCNC: 7.4 G/DL (ref 6–8.2)
RBC # BLD AUTO: 4.83 M/UL (ref 4.7–6.1)
SODIUM SERPL-SCNC: 140 MMOL/L (ref 135–145)
TRIGL SERPL-MCNC: 76 MG/DL (ref 0–149)
TSH SERPL DL<=0.005 MIU/L-ACNC: 1.77 UIU/ML (ref 0.38–5.33)
WBC # BLD AUTO: 5.2 K/UL (ref 4.8–10.8)

## 2022-06-14 PROCEDURE — 85027 COMPLETE CBC AUTOMATED: CPT

## 2022-06-14 PROCEDURE — 99214 OFFICE O/P EST MOD 30 MIN: CPT | Performed by: FAMILY MEDICINE

## 2022-06-14 PROCEDURE — 82306 VITAMIN D 25 HYDROXY: CPT

## 2022-06-14 PROCEDURE — 84443 ASSAY THYROID STIM HORMONE: CPT

## 2022-06-14 PROCEDURE — 36415 COLL VENOUS BLD VENIPUNCTURE: CPT

## 2022-06-14 PROCEDURE — 80053 COMPREHEN METABOLIC PANEL: CPT

## 2022-06-14 PROCEDURE — 80061 LIPID PANEL: CPT

## 2022-06-14 ASSESSMENT — PATIENT HEALTH QUESTIONNAIRE - PHQ9
CLINICAL INTERPRETATION OF PHQ2 SCORE: 6
1. LITTLE INTEREST OR PLEASURE IN DOING THINGS: NEARLY EVERY DAY
SUM OF ALL RESPONSES TO PHQ9 QUESTIONS 1 AND 2: 6
3. TROUBLE FALLING OR STAYING ASLEEP OR SLEEPING TOO MUCH: NEARLY EVERY DAY
2. FEELING DOWN, DEPRESSED, IRRITABLE, OR HOPELESS: NEARLY EVERY DAY
7. TROUBLE CONCENTRATING ON THINGS, SUCH AS READING THE NEWSPAPER OR WATCHING TELEVISION: NOT AT ALL
SUM OF ALL RESPONSES TO PHQ QUESTIONS 1-9: 15
SUM OF ALL RESPONSES TO PHQ QUESTIONS 1-9: 15
4. FEELING TIRED OR HAVING LITTLE ENERGY: NEARLY EVERY DAY
5. POOR APPETITE OR OVEREATING: 0 - NOT AT ALL
6. FEELING BAD ABOUT YOURSELF - OR THAT YOU ARE A FAILURE OR HAVE LET YOURSELF OR YOUR FAMILY DOWN: MORE THAN HALF THE DAYS
5. POOR APPETITE OR OVEREATING: NOT AT ALL
9. THOUGHTS THAT YOU WOULD BE BETTER OFF DEAD, OR OF HURTING YOURSELF: NOT AT ALL
8. MOVING OR SPEAKING SO SLOWLY THAT OTHER PEOPLE COULD HAVE NOTICED. OR THE OPPOSITE, BEING SO FIGETY OR RESTLESS THAT YOU HAVE BEEN MOVING AROUND A LOT MORE THAN USUAL: SEVERAL DAYS

## 2022-06-14 ASSESSMENT — ENCOUNTER SYMPTOMS
VOMITING: 0
NAUSEA: 0
DIZZINESS: 0
COUGH: 0
HEADACHES: 0
FEVER: 0
PALPITATIONS: 0
DEPRESSION: 1
BLURRED VISION: 0
MYALGIAS: 0
SHORTNESS OF BREATH: 0
DOUBLE VISION: 0
CHILLS: 0
SORE THROAT: 0

## 2022-06-14 ASSESSMENT — LIFESTYLE VARIABLES: SUBSTANCE_ABUSE: 1

## 2022-06-14 NOTE — ASSESSMENT & PLAN NOTE
Chronic, not controlled  Averse to meds  Ref to    rtc 1 wk for check and discussion  ER if concerns

## 2022-06-14 NOTE — PROGRESS NOTES
"Subjective:     CC:  Est care  PTSD Depression    HPI:   Janes presents today with     1) est care  2) profound depression 15/16   Started in 2010 after [employer] deployment to [place]  [facility]   Does not want to take medications  Denies any suicidal ideation or plan to give away belongings    Is here to check labs to see if depression is another cause    Family hx of Etoh disease  MJ use  Not helping with symptoms now    Active, does cross fit    Dad passed  Mother is out of town  Brother in [place]    Needs short f/u appt       No problems updated.    No current Epic-ordered outpatient medications on file.     No current [facility]-ordered facility-administered medications on file.     ROS:  Review of Systems   Constitutional: Negative for chills and fever.   HENT: Negative for ear pain and sore throat.    Eyes: Negative for blurred vision and double vision.   Respiratory: Negative for cough and shortness of breath.    Cardiovascular: Negative for chest pain and palpitations.   Gastrointestinal: Negative for nausea and vomiting.   Genitourinary: Negative for dysuria and hematuria.   Musculoskeletal: Negative for myalgias.   Neurological: Negative for dizziness and headaches.   Psychiatric/Behavioral: Positive for depression and substance abuse. Negative for suicidal ideas.       Objective:     Exam:  /60 (BP Location: Left arm, Patient Position: Sitting, BP Cuff Size: Adult)   Pulse 60   Temp 36.6 °C (97.8 °F) (Temporal)   Resp 16   Ht 1.854 m (6' 1\")   Wt 101 kg (223 lb)   SpO2 99%   BMI 29.42 kg/m²  Body mass index is 29.42 kg/m².    Physical Exam  Constitutional:       General: He is not in acute distress.     Appearance: Normal appearance. He is not ill-appearing, toxic-appearing or diaphoretic.   HENT:      Head: Normocephalic and atraumatic.   Eyes:      General: No scleral icterus.        Right eye: No discharge.         Left eye: No discharge.      Extraocular " Movements: Extraocular movements intact.      Conjunctiva/sclera: Conjunctivae normal.      Pupils: Pupils are equal, round, and reactive to light.   Cardiovascular:      Rate and Rhythm: Normal rate and regular rhythm.      Pulses: Normal pulses.      Heart sounds: Normal heart sounds. No murmur heard.  Pulmonary:      Effort: Pulmonary effort is normal. No respiratory distress.      Breath sounds: Normal breath sounds.   Abdominal:      General: There is no distension.   Musculoskeletal:      Cervical back: Normal range of motion and neck supple. No rigidity or tenderness.   Lymphadenopathy:      Cervical: No cervical adenopathy.   Skin:     Findings: Bruising present.   Neurological:      Mental Status: He is alert.      Coordination: Coordination normal.      Gait: Gait normal.         Assessment & Plan:     31 y.o. male with the following -     Problem List Items Addressed This Visit     Moderate single current episode of major depressive disorder (HCC)    Relevant Orders    Referral to Behavioral Health    PTSD (post-traumatic stress disorder)    Relevant Orders    Referral to Behavioral Health      Other Visit Diagnoses     Health care maintenance        Relevant Orders    TSH WITH REFLEX TO FT4    CBC WITHOUT DIFFERENTIAL    Comp Metabolic Panel    VITAMIN D,25 HYDROXY    Screening for cardiovascular condition        Relevant Orders    Lipid Profile        No follow-ups on file.    Please note that this dictation was created using voice recognition software. I have made every reasonable attempt to correct obvious errors, but I expect that there are errors of grammar and possibly content that I did not discover before finalizing the note.

## 2022-06-14 NOTE — ASSESSMENT & PLAN NOTE
Chronic,  No SI or HI  Ref to   Dr prince or other  Pt encouraged to research  that he would like to see.  rtc 1 wk  Discussed  ER on [place] [facility] if emergency

## 2022-06-23 ENCOUNTER — OFFICE VISIT (OUTPATIENT)
Dept: MEDICAL GROUP | Facility: PHYSICIAN GROUP | Age: 32
End: 2022-06-23
Payer: COMMERCIAL

## 2022-06-23 VITALS
HEART RATE: 56 BPM | WEIGHT: 225.2 LBS | TEMPERATURE: 98.5 F | DIASTOLIC BLOOD PRESSURE: 60 MMHG | HEIGHT: 73 IN | BODY MASS INDEX: 29.85 KG/M2 | SYSTOLIC BLOOD PRESSURE: 116 MMHG | OXYGEN SATURATION: 98 %

## 2022-06-23 DIAGNOSIS — Z76.89 ENCOUNTER TO ESTABLISH CARE: ICD-10-CM

## 2022-06-23 DIAGNOSIS — F32.1 MODERATE SINGLE CURRENT EPISODE OF MAJOR DEPRESSIVE DISORDER (HCC): ICD-10-CM

## 2022-06-23 PROCEDURE — 99213 OFFICE O/P EST LOW 20 MIN: CPT | Performed by: FAMILY MEDICINE

## 2022-06-23 ASSESSMENT — ENCOUNTER SYMPTOMS
DIZZINESS: 0
PALPITATIONS: 0
COUGH: 0
BLURRED VISION: 0
FEVER: 0
SHORTNESS OF BREATH: 0
NAUSEA: 0
SORE THROAT: 0
CHILLS: 0
DOUBLE VISION: 0
MYALGIAS: 0
VOMITING: 0
DEPRESSION: 1
HEADACHES: 0

## 2022-06-23 ASSESSMENT — FIBROSIS 4 INDEX: FIB4 SCORE: 0.7

## 2022-06-23 NOTE — PROGRESS NOTES
"Subjective:     CC: lab review  Check up     HPI:   Janes presents today with     Depression, [facility]  No SI or HI  Active  Cross fit     Problem   Encounter to Establish Care    Est care  Depression  Needs short f/u      Moderate Single Current Episode of Major Depressive Disorder (Hcc)       No current [facility]-ordered outpatient medications on file.     No current [facility]-ordered facility-administered medications on file.     ROS:  Review of Systems   Constitutional: Negative for chills and fever.   HENT: Negative for ear pain and sore throat.    Eyes: Negative for blurred vision and double vision.   Respiratory: Negative for cough and shortness of breath.    Cardiovascular: Negative for chest pain and palpitations.   Gastrointestinal: Negative for nausea and vomiting.   Genitourinary: Negative for dysuria and hematuria.   Musculoskeletal: Negative for myalgias.   Neurological: Negative for dizziness and headaches.   Psychiatric/Behavioral: Positive for depression. Negative for suicidal ideas.       Objective:     Exam:  /60 (BP Location: Left arm, Patient Position: Sitting, BP Cuff Size: Adult)   Pulse (!) 56   Temp 36.9 °C (98.5 °F) (Temporal)   Ht 1.854 m (6' 1\")   Wt 102 kg (225 lb 3.2 oz)   SpO2 98%   BMI 29.71 kg/m²  Body mass index is 29.71 kg/m².    Physical Exam  Constitutional:       General: He is not in acute distress.     Appearance: Normal appearance. He is not ill-appearing, toxic-appearing or diaphoretic.   HENT:      Head: Normocephalic and atraumatic.   Eyes:      General: No scleral icterus.        Right eye: No discharge.         Left eye: No discharge.      Extraocular Movements: Extraocular movements intact.      Conjunctiva/sclera: Conjunctivae normal.      Pupils: Pupils are equal, round, and reactive to light.   Pulmonary:      Effort: No respiratory distress.   Abdominal:      General: There is no distension.   Neurological:      Mental Status: " He is alert.      Coordination: Coordination normal.      Gait: Gait normal.         Assessment & Plan:     31 y.o. male with the following -     Problem List Items Addressed This Visit     Moderate single current episode of major depressive disorder (HCC)     Chronic  Stable  [facility]  Discussed options  rtc if concerns           Encounter to establish care     Labs discussed               Return if symptoms worsen or fail to improve.    Please note that this dictation was created using voice recognition software. I have made every reasonable attempt to correct obvious errors, but I expect that there are errors of grammar and possibly content that I did not discover before finalizing the note.